# Patient Record
Sex: MALE | Race: WHITE | Employment: FULL TIME | URBAN - NONMETROPOLITAN AREA
[De-identification: names, ages, dates, MRNs, and addresses within clinical notes are randomized per-mention and may not be internally consistent; named-entity substitution may affect disease eponyms.]

---

## 2020-10-14 ENCOUNTER — HOSPITAL ENCOUNTER (EMERGENCY)
Age: 60
Discharge: HOME | End: 2020-10-14
Attending: EMERGENCY MEDICINE
Payer: OTHER MISCELLANEOUS

## 2020-10-14 VITALS
OXYGEN SATURATION: 97 % | TEMPERATURE: 98 F | RESPIRATION RATE: 16 BRPM | DIASTOLIC BLOOD PRESSURE: 97 MMHG | WEIGHT: 194 LBS | SYSTOLIC BLOOD PRESSURE: 143 MMHG | HEART RATE: 83 BPM | HEIGHT: 72 IN | BODY MASS INDEX: 26.28 KG/M2

## 2020-10-14 DIAGNOSIS — S61.213A LACERATION OF LEFT MIDDLE FINGER WITHOUT FOREIGN BODY WITHOUT DAMAGE TO NAIL, INITIAL ENCOUNTER: Primary | ICD-10-CM

## 2020-10-14 PROCEDURE — 75810000053 HC SPLINT APPLICATION

## 2020-10-14 PROCEDURE — 74011000250 HC RX REV CODE- 250: Performed by: EMERGENCY MEDICINE

## 2020-10-14 PROCEDURE — 99283 EMERGENCY DEPT VISIT LOW MDM: CPT

## 2020-10-14 RX ORDER — BACITRACIN 500 UNIT/G
1 PACKET (EA) TOPICAL
Status: COMPLETED | OUTPATIENT
Start: 2020-10-14 | End: 2020-10-14

## 2020-10-14 RX ORDER — LIDOCAINE HYDROCHLORIDE AND EPINEPHRINE 20; 10 MG/ML; UG/ML
1.5 INJECTION, SOLUTION INFILTRATION; PERINEURAL ONCE
Status: COMPLETED | OUTPATIENT
Start: 2020-10-14 | End: 2020-10-14

## 2020-10-14 RX ADMIN — LIDOCAINE HYDROCHLORIDE,EPINEPHRINE BITARTRATE 30 MG: 20; .01 INJECTION, SOLUTION INFILTRATION; PERINEURAL at 09:01

## 2020-10-14 RX ADMIN — BACITRACIN 1 PACKET: 500 OINTMENT TOPICAL at 09:12

## 2020-10-14 NOTE — ED PROVIDER NOTES
Laceration      Presents after an accidental work injury. 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 The other worker accidentally brushed the knife against patient's left middle finger causing a laceration with bleeding and minor pain. The bleeding was originally controlled by a pressure dressing and direct pressure, however when released the bleeding resumed. Patient denies any other injury or trauma and denies sensory or motor changes to the finger. History reviewed. No pertinent past medical history. History reviewed. No pertinent surgical history. History reviewed. No pertinent family history.     Social History     Socioeconomic History    Marital status: Not on file     Spouse name: Not on file    Number of children: Not on file    Years of education: Not on file    Highest education level: Not on file   Occupational History    Not on file   Social Needs    Financial resource strain: Not on file    Food insecurity     Worry: Not on file     Inability: Not on file    Transportation needs     Medical: Not on file     Non-medical: Not on file   Tobacco Use    Smoking status: Never Smoker    Smokeless tobacco: Never Used   Substance and Sexual Activity    Alcohol use: Not Currently    Drug use: Never    Sexual activity: Not Currently   Lifestyle    Physical activity     Days per week: Not on file     Minutes per session: Not on file    Stress: Not on file   Relationships    Social connections     Talks on phone: Not on file     Gets together: Not on file     Attends Restorationist service: Not on file     Active member of club or organization: Not on file     Attends meetings of clubs or organizations: Not on file     Relationship status: Not on file    Intimate partner violence     Fear of current or ex partner: Not on file     Emotionally abused: Not on file     Physically abused: Not on file     Forced sexual activity: Not on file   Other Topics Concern    Not on file   Social History Narrative    Not on file         ALLERGIES: Sulfa (sulfonamide antibiotics)    Review of Systems   Constitutional: Negative. HENT: Negative. Respiratory: Negative. Cardiovascular: Negative. Gastrointestinal: Negative. Endocrine: Negative. Genitourinary: Negative. Musculoskeletal: Negative. Allergic/Immunologic: Negative. Neurological: Negative. Hematological: Negative. Psychiatric/Behavioral: Negative. All other systems reviewed and are negative. Vitals:    10/14/20 0840   BP: (!) 142/91   Pulse: 89   Resp: 16   Temp: 98 °F (36.7 °C)   SpO2: 97%   Weight: 88 kg (194 lb)   Height: 6' (1.829 m)            Physical Exam  Vitals signs and nursing note reviewed. Constitutional:       General: He is not in acute distress. Appearance: Normal appearance. He is normal weight. HENT:      Head: Normocephalic and atraumatic. Mouth/Throat:      Mouth: Mucous membranes are moist.   Eyes:      Extraocular Movements: Extraocular movements intact. Pupils: Pupils are equal, round, and reactive to light. Pulmonary:      Effort: Pulmonary effort is normal. No respiratory distress. Musculoskeletal: Normal range of motion. General: Signs of injury present. No swelling or deformity. Comments: There is an approximate 2 cm deep linear laceration to the phalanx of the left middle finger with an arterial bleed. Skin:     General: Skin is warm and dry. Neurological:      General: No focal deficit present. Mental Status: He is alert and oriented to person, place, and time. MDM     Patient with a simple but deep laceration with a small arterial bleed. We have controlled bleeding with a blood pressure cuff to allow wound repair.   One cuff released, very minor bleeding resumed controlled with direct pressure and tip of finger although dusky at first is reperfusing. Wound Repair    Date/Time: 10/14/2020 9:06 AM  Performed by: attendingPreparation: skin prepped with Shur-Clens and sterile field established  Location details: left long finger  Wound length:2.5 cm or less  Anesthesia: local infiltration    Anesthesia:  Local Anesthetic: lidocaine 2% with epinephrine  Anesthetic total: 2 mL  Foreign bodies: no foreign bodies  Irrigation solution: saline  Debridement: none  Skin closure: 4-0 nylon  Technique: simple and interrupted  Approximation: close  Dressing: antibiotic ointment and gauze packing  Patient tolerance: Patient tolerated the procedure well with no immediate complications  My total time at bedside, performing this procedure was 16-30 minutes.

## 2020-10-14 NOTE — ED NOTES
Patient given discharge instructions and verbalized understanding of all f/u and discharge instructions given. Patient departed the ED at this tie in good condition.

## 2020-10-14 NOTE — ED TRIAGE NOTES
Patient  Presents to ed from work with a pressure dressing noted to his right middle finger. 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 Took pressure dressing  Off of digit and found a 2\" laceration noted to right middle digit with a pulsing arterial bleed noted. Pressure immediately reapplied. Dr. Cortney Clarke at bedside to see patient at this time. Patient reports he is up-to-date on his tetanus vaccine.

## 2023-03-16 ENCOUNTER — TRANSCRIBE ORDER (OUTPATIENT)
Dept: SCHEDULING | Age: 63
End: 2023-03-16

## 2023-03-16 DIAGNOSIS — I63.40 CEREBRAL EMBOLISM WITH CEREBRAL INFARCTION (HCC): Primary | ICD-10-CM

## 2023-03-22 ENCOUNTER — HOSPITAL ENCOUNTER (OUTPATIENT)
Dept: NON INVASIVE DIAGNOSTICS | Age: 63
Discharge: HOME | End: 2023-03-22
Attending: PSYCHIATRY & NEUROLOGY
Payer: OTHER GOVERNMENT

## 2023-03-22 DIAGNOSIS — I63.40 CEREBRAL EMBOLISM WITH CEREBRAL INFARCTION (HCC): ICD-10-CM

## 2023-03-22 LAB
LEFT CCA DIST DIAS: 37.8 CM/S
LEFT CCA DIST SYS: 113 CM/S
LEFT CCA PROX DIAS: 35.1 CM/S
LEFT CCA PROX SYS: 116 CM/S
LEFT ECA DIAS: 13.7 CM/S
LEFT ECA SYS: 88 CM/S
LEFT ICA DIST DIAS: 26.9 CM/S
LEFT ICA DIST SYS: 44.7 CM/S
LEFT ICA PROX DIAS: 11.7 CM/S
LEFT ICA PROX SYS: 71.5 CM/S
LEFT ICA/CCA SYS: 0.63
LEFT VERTEBRAL DIAS: 13.8 CM/S
LEFT VERTEBRAL SYS: 43 CM/S
RIGHT CCA DIST DIAS: 30.6 CM/S
RIGHT CCA DIST SYS: 96.2 CM/S
RIGHT CCA PROX DIAS: 23.7 CM/S
RIGHT CCA PROX SYS: 107 CM/S
RIGHT ECA DIAS: 23.7 CM/S
RIGHT ECA SYS: 103 CM/S
RIGHT ICA DIST DIAS: 22 CM/S
RIGHT ICA DIST SYS: 49.2 CM/S
RIGHT ICA PROX DIAS: 30.6 CM/S
RIGHT ICA PROX SYS: 89.4 CM/S
RIGHT ICA/CCA SYS: 0.93
RIGHT VERTEBRAL DIAS: 9.55 CM/S
RIGHT VERTEBRAL SYS: 32.8 CM/S
VAS LEFT SUBCLAVIAN PROX EDV: 0 CM/S
VAS LEFT SUBCLAVIAN PROX PSV: 70 CM/S
VAS RIGHT SUBCLAVIAN PROX EDV: 0 CM/S
VAS RIGHT SUBCLAVIAN PROX PSV: 81.6 CM/S

## 2023-03-22 PROCEDURE — 93880 EXTRACRANIAL BILAT STUDY: CPT

## 2023-05-22 ENCOUNTER — TRANSCRIBE ORDERS (OUTPATIENT)
Facility: HOSPITAL | Age: 63
End: 2023-05-22

## 2023-05-22 DIAGNOSIS — M79.605 LEFT LEG PAIN: Primary | ICD-10-CM

## 2023-05-25 ENCOUNTER — HOSPITAL ENCOUNTER (OUTPATIENT)
Facility: HOSPITAL | Age: 63
Discharge: HOME | End: 2023-05-27
Payer: OTHER GOVERNMENT

## 2023-05-25 DIAGNOSIS — M79.605 LEFT LEG PAIN: ICD-10-CM

## 2023-05-25 PROCEDURE — 93971 EXTREMITY STUDY: CPT

## 2024-10-25 ENCOUNTER — TRANSCRIBE ORDERS (OUTPATIENT)
Facility: HOSPITAL | Age: 64
End: 2024-10-25

## 2024-10-25 DIAGNOSIS — M79.605 LEFT LEG PAIN: Primary | ICD-10-CM

## 2024-11-04 ENCOUNTER — HOSPITAL ENCOUNTER (OUTPATIENT)
Facility: HOSPITAL | Age: 64
Discharge: HOME | End: 2024-11-06
Payer: OTHER GOVERNMENT

## 2024-11-04 DIAGNOSIS — M79.605 LEFT LEG PAIN: ICD-10-CM

## 2024-11-04 PROCEDURE — 93923 UPR/LXTR ART STDY 3+ LVLS: CPT

## 2024-11-05 LAB
VAS LEFT ABI: 1.19
VAS LEFT ARM BP: 125 MMHG
VAS LEFT DORSALIS PEDIS BP: 132 MMHG
VAS LEFT PTA BP: 149 MMHG
VAS LEFT TBI: 0.84
VAS LEFT TOE PRESSURE: 105 MMHG
VAS RIGHT ABI: 1.13
VAS RIGHT ARM BP: 117 MMHG
VAS RIGHT DORSALIS PEDIS BP: 137 MMHG
VAS RIGHT PTA BP: 141 MMHG
VAS RIGHT TBI: 0.89
VAS RIGHT TOE PRESSURE: 111 MMHG

## 2024-11-05 PROCEDURE — 93923 UPR/LXTR ART STDY 3+ LVLS: CPT | Performed by: SURGERY
